# Patient Record
Sex: MALE | Race: WHITE
[De-identification: names, ages, dates, MRNs, and addresses within clinical notes are randomized per-mention and may not be internally consistent; named-entity substitution may affect disease eponyms.]

---

## 2018-11-21 ENCOUNTER — HOSPITAL ENCOUNTER (EMERGENCY)
Dept: HOSPITAL 83 - ED | Age: 23
Discharge: HOME | End: 2018-11-21
Payer: SELF-PAY

## 2018-11-21 VITALS — HEIGHT: 70.98 IN | BODY MASS INDEX: 26.6 KG/M2 | WEIGHT: 190 LBS

## 2018-11-21 VITALS — DIASTOLIC BLOOD PRESSURE: 85 MMHG

## 2018-11-21 DIAGNOSIS — Z79.899: ICD-10-CM

## 2018-11-21 DIAGNOSIS — K04.7: Primary | ICD-10-CM

## 2022-10-24 ENCOUNTER — HOSPITAL ENCOUNTER (INPATIENT)
Age: 27
LOS: 2 days | Discharge: HOME | DRG: 143 | End: 2022-10-26
Attending: EMERGENCY MEDICINE | Admitting: STUDENT IN AN ORGANIZED HEALTH CARE EDUCATION/TRAINING PROGRAM
Payer: MEDICARE

## 2022-10-24 ENCOUNTER — APPOINTMENT (OUTPATIENT)
Dept: GENERAL RADIOLOGY | Age: 27
DRG: 143 | End: 2022-10-24
Payer: MEDICARE

## 2022-10-24 DIAGNOSIS — J93.11 PRIMARY SPONTANEOUS PNEUMOTHORAX: Primary | ICD-10-CM

## 2022-10-24 PROBLEM — J93.9 PNEUMOTHORAX: Status: ACTIVE | Noted: 2022-10-24

## 2022-10-24 LAB
ALBUMIN SERPL-MCNC: 4.9 G/DL (ref 3.5–5.2)
ALP BLD-CCNC: 70 U/L (ref 40–129)
ALT SERPL-CCNC: 13 U/L (ref 0–40)
ANION GAP SERPL CALCULATED.3IONS-SCNC: 13 MMOL/L (ref 7–16)
AST SERPL-CCNC: 18 U/L (ref 0–39)
BASOPHILS ABSOLUTE: 0.04 E9/L (ref 0–0.2)
BASOPHILS RELATIVE PERCENT: 0.3 % (ref 0–2)
BILIRUB SERPL-MCNC: 1 MG/DL (ref 0–1.2)
BUN BLDV-MCNC: 7 MG/DL (ref 6–20)
CALCIUM SERPL-MCNC: 10.1 MG/DL (ref 8.6–10.2)
CHLORIDE BLD-SCNC: 98 MMOL/L (ref 98–107)
CO2: 26 MMOL/L (ref 22–29)
CREAT SERPL-MCNC: 1.1 MG/DL (ref 0.7–1.2)
EKG ATRIAL RATE: 65 BPM
EKG P AXIS: 74 DEGREES
EKG P-R INTERVAL: 164 MS
EKG Q-T INTERVAL: 372 MS
EKG QRS DURATION: 90 MS
EKG QTC CALCULATION (BAZETT): 386 MS
EKG R AXIS: 66 DEGREES
EKG T AXIS: 70 DEGREES
EKG VENTRICULAR RATE: 65 BPM
EOSINOPHILS ABSOLUTE: 0.09 E9/L (ref 0.05–0.5)
EOSINOPHILS RELATIVE PERCENT: 0.7 % (ref 0–6)
GFR SERPL CREATININE-BSD FRML MDRD: >60 ML/MIN/1.73
GLUCOSE BLD-MCNC: 111 MG/DL (ref 74–99)
HCT VFR BLD CALC: 46.6 % (ref 37–54)
HEMOGLOBIN: 16.1 G/DL (ref 12.5–16.5)
IMMATURE GRANULOCYTES #: 0.06 E9/L
IMMATURE GRANULOCYTES %: 0.5 % (ref 0–5)
LYMPHOCYTES ABSOLUTE: 1.38 E9/L (ref 1.5–4)
LYMPHOCYTES RELATIVE PERCENT: 11.5 % (ref 20–42)
MCH RBC QN AUTO: 34 PG (ref 26–35)
MCHC RBC AUTO-ENTMCNC: 34.5 % (ref 32–34.5)
MCV RBC AUTO: 98.3 FL (ref 80–99.9)
MONOCYTES ABSOLUTE: 0.79 E9/L (ref 0.1–0.95)
MONOCYTES RELATIVE PERCENT: 6.6 % (ref 2–12)
NEUTROPHILS ABSOLUTE: 9.67 E9/L (ref 1.8–7.3)
NEUTROPHILS RELATIVE PERCENT: 80.4 % (ref 43–80)
PDW BLD-RTO: 12.3 FL (ref 11.5–15)
PLATELET # BLD: 209 E9/L (ref 130–450)
PMV BLD AUTO: 10.7 FL (ref 7–12)
POTASSIUM SERPL-SCNC: 3.7 MMOL/L (ref 3.5–5)
RBC # BLD: 4.74 E12/L (ref 3.8–5.8)
SODIUM BLD-SCNC: 137 MMOL/L (ref 132–146)
TOTAL PROTEIN: 7.6 G/DL (ref 6.4–8.3)
TROPONIN, HIGH SENSITIVITY: <6 NG/L (ref 0–11)
WBC # BLD: 12 E9/L (ref 4.5–11.5)

## 2022-10-24 PROCEDURE — 85025 COMPLETE CBC W/AUTO DIFF WBC: CPT

## 2022-10-24 PROCEDURE — 2500000003 HC RX 250 WO HCPCS

## 2022-10-24 PROCEDURE — 80053 COMPREHEN METABOLIC PANEL: CPT

## 2022-10-24 PROCEDURE — 93005 ELECTROCARDIOGRAM TRACING: CPT | Performed by: PHYSICIAN ASSISTANT

## 2022-10-24 PROCEDURE — 2060000000 HC ICU INTERMEDIATE R&B

## 2022-10-24 PROCEDURE — 71045 X-RAY EXAM CHEST 1 VIEW: CPT

## 2022-10-24 PROCEDURE — 0W9B30Z DRAINAGE OF LEFT PLEURAL CAVITY WITH DRAINAGE DEVICE, PERCUTANEOUS APPROACH: ICD-10-PCS | Performed by: EMERGENCY MEDICINE

## 2022-10-24 PROCEDURE — 84484 ASSAY OF TROPONIN QUANT: CPT

## 2022-10-24 PROCEDURE — 32551 INSERTION OF CHEST TUBE: CPT

## 2022-10-24 PROCEDURE — 99152 MOD SED SAME PHYS/QHP 5/>YRS: CPT

## 2022-10-24 PROCEDURE — 2500000003 HC RX 250 WO HCPCS: Performed by: EMERGENCY MEDICINE

## 2022-10-24 PROCEDURE — 6360000002 HC RX W HCPCS: Performed by: STUDENT IN AN ORGANIZED HEALTH CARE EDUCATION/TRAINING PROGRAM

## 2022-10-24 PROCEDURE — 99285 EMERGENCY DEPT VISIT HI MDM: CPT

## 2022-10-24 PROCEDURE — 71046 X-RAY EXAM CHEST 2 VIEWS: CPT

## 2022-10-24 PROCEDURE — 2580000003 HC RX 258: Performed by: STUDENT IN AN ORGANIZED HEALTH CARE EDUCATION/TRAINING PROGRAM

## 2022-10-24 RX ORDER — SENNA PLUS 8.6 MG/1
1 TABLET ORAL DAILY PRN
Status: DISCONTINUED | OUTPATIENT
Start: 2022-10-24 | End: 2022-10-26 | Stop reason: HOSPADM

## 2022-10-24 RX ORDER — KETAMINE HYDROCHLORIDE 10 MG/ML
1 INJECTION, SOLUTION INTRAMUSCULAR; INTRAVENOUS ONCE
Status: COMPLETED | OUTPATIENT
Start: 2022-10-24 | End: 2022-10-24

## 2022-10-24 RX ORDER — POTASSIUM CHLORIDE 7.45 MG/ML
10 INJECTION INTRAVENOUS PRN
Status: DISCONTINUED | OUTPATIENT
Start: 2022-10-24 | End: 2022-10-26 | Stop reason: HOSPADM

## 2022-10-24 RX ORDER — SODIUM CHLORIDE, SODIUM LACTATE, POTASSIUM CHLORIDE, CALCIUM CHLORIDE 600; 310; 30; 20 MG/100ML; MG/100ML; MG/100ML; MG/100ML
INJECTION, SOLUTION INTRAVENOUS ONCE
Status: COMPLETED | OUTPATIENT
Start: 2022-10-24 | End: 2022-10-24

## 2022-10-24 RX ORDER — LANOLIN ALCOHOL/MO/W.PET/CERES
3 CREAM (GRAM) TOPICAL NIGHTLY PRN
Status: DISCONTINUED | OUTPATIENT
Start: 2022-10-24 | End: 2022-10-26 | Stop reason: HOSPADM

## 2022-10-24 RX ORDER — POTASSIUM CHLORIDE 20 MEQ/1
40 TABLET, EXTENDED RELEASE ORAL PRN
Status: DISCONTINUED | OUTPATIENT
Start: 2022-10-24 | End: 2022-10-26 | Stop reason: HOSPADM

## 2022-10-24 RX ORDER — ACETAMINOPHEN 650 MG/1
650 SUPPOSITORY RECTAL EVERY 6 HOURS PRN
Status: DISCONTINUED | OUTPATIENT
Start: 2022-10-24 | End: 2022-10-26 | Stop reason: HOSPADM

## 2022-10-24 RX ORDER — ENOXAPARIN SODIUM 100 MG/ML
40 INJECTION SUBCUTANEOUS DAILY
Status: DISCONTINUED | OUTPATIENT
Start: 2022-10-24 | End: 2022-10-26 | Stop reason: HOSPADM

## 2022-10-24 RX ORDER — ONDANSETRON 2 MG/ML
4 INJECTION INTRAMUSCULAR; INTRAVENOUS EVERY 6 HOURS PRN
Status: DISCONTINUED | OUTPATIENT
Start: 2022-10-24 | End: 2022-10-26 | Stop reason: HOSPADM

## 2022-10-24 RX ORDER — ONDANSETRON 4 MG/1
4 TABLET, ORALLY DISINTEGRATING ORAL EVERY 8 HOURS PRN
Status: DISCONTINUED | OUTPATIENT
Start: 2022-10-24 | End: 2022-10-26 | Stop reason: HOSPADM

## 2022-10-24 RX ORDER — SODIUM CHLORIDE 0.9 % (FLUSH) 0.9 %
10 SYRINGE (ML) INJECTION EVERY 12 HOURS SCHEDULED
Status: DISCONTINUED | OUTPATIENT
Start: 2022-10-24 | End: 2022-10-26 | Stop reason: HOSPADM

## 2022-10-24 RX ORDER — LIDOCAINE HYDROCHLORIDE 10 MG/ML
20 INJECTION, SOLUTION INFILTRATION; PERINEURAL ONCE
Status: COMPLETED | OUTPATIENT
Start: 2022-10-24 | End: 2022-10-24

## 2022-10-24 RX ORDER — MORPHINE SULFATE 2 MG/ML
2 INJECTION, SOLUTION INTRAMUSCULAR; INTRAVENOUS EVERY 4 HOURS PRN
Status: DISCONTINUED | OUTPATIENT
Start: 2022-10-24 | End: 2022-10-25

## 2022-10-24 RX ORDER — ACETAMINOPHEN 325 MG/1
650 TABLET ORAL EVERY 6 HOURS PRN
Status: DISCONTINUED | OUTPATIENT
Start: 2022-10-24 | End: 2022-10-26 | Stop reason: HOSPADM

## 2022-10-24 RX ORDER — SODIUM CHLORIDE 0.9 % (FLUSH) 0.9 %
10 SYRINGE (ML) INJECTION PRN
Status: DISCONTINUED | OUTPATIENT
Start: 2022-10-24 | End: 2022-10-26 | Stop reason: HOSPADM

## 2022-10-24 RX ORDER — SODIUM CHLORIDE 9 MG/ML
INJECTION, SOLUTION INTRAVENOUS PRN
Status: DISCONTINUED | OUTPATIENT
Start: 2022-10-24 | End: 2022-10-26 | Stop reason: HOSPADM

## 2022-10-24 RX ADMIN — SODIUM CHLORIDE, PRESERVATIVE FREE 10 ML: 5 INJECTION INTRAVENOUS at 20:15

## 2022-10-24 RX ADMIN — ENOXAPARIN SODIUM 40 MG: 100 INJECTION SUBCUTANEOUS at 20:15

## 2022-10-24 RX ADMIN — LIDOCAINE HYDROCHLORIDE 20 ML: 10 INJECTION, SOLUTION INFILTRATION; PERINEURAL at 16:55

## 2022-10-24 RX ADMIN — KETAMINE HYDROCHLORIDE 77.1 MG: 10 INJECTION, SOLUTION INTRAMUSCULAR; INTRAVENOUS at 16:54

## 2022-10-24 RX ADMIN — SODIUM CHLORIDE, POTASSIUM CHLORIDE, SODIUM LACTATE AND CALCIUM CHLORIDE: 600; 310; 30; 20 INJECTION, SOLUTION INTRAVENOUS at 20:15

## 2022-10-24 ASSESSMENT — PAIN DESCRIPTION - LOCATION: LOCATION: CHEST

## 2022-10-24 ASSESSMENT — LIFESTYLE VARIABLES
HOW OFTEN DO YOU HAVE A DRINK CONTAINING ALCOHOL: NEVER
HOW OFTEN DO YOU HAVE A DRINK CONTAINING ALCOHOL: MONTHLY OR LESS
HOW MANY STANDARD DRINKS CONTAINING ALCOHOL DO YOU HAVE ON A TYPICAL DAY: PATIENT DOES NOT DRINK
HOW MANY STANDARD DRINKS CONTAINING ALCOHOL DO YOU HAVE ON A TYPICAL DAY: PATIENT DOES NOT DRINK

## 2022-10-24 ASSESSMENT — PAIN SCALES - GENERAL: PAINLEVEL_OUTOF10: 6

## 2022-10-24 ASSESSMENT — ENCOUNTER SYMPTOMS
VOMITING: 0
ABDOMINAL PAIN: 0
EYES NEGATIVE: 1
SHORTNESS OF BREATH: 1
NAUSEA: 0
DIARRHEA: 0

## 2022-10-24 ASSESSMENT — PAIN DESCRIPTION - ORIENTATION: ORIENTATION: UPPER;LEFT

## 2022-10-24 ASSESSMENT — PAIN DESCRIPTION - DESCRIPTORS: DESCRIPTORS: STABBING

## 2022-10-24 NOTE — PROGRESS NOTES
Admission database completed to best of this RN's ability. Care plan and education initiated. Pt independent from home with aunt. Denies any DME or [place] [id number] services prior to admission.

## 2022-10-24 NOTE — ED PROVIDER NOTES
Pollie Dubin is a 31-year-old male who presents to the emergency department for chest pain that started approximately 4 AM.  Reports the complaint is constant, moderate severity, and worsens with deep breath. He denies any fall or trauma, states he just woke up to the pain. Admits to smoking cigarettes. Patient also states he is lost approximately 100 pounds over the past 5 years, was not attempting to lose weight. He denies hemoptysis, fever, chills, radiating pain, abdominal pain, nausea vomiting diarrhea. The history is provided by the patient. Review of Systems   Constitutional:  Negative for chills and fever. HENT: Negative. Negative for congestion. Eyes: Negative. Respiratory:  Positive for shortness of breath. Cardiovascular:  Positive for chest pain. Gastrointestinal:  Negative for abdominal pain, diarrhea, nausea and vomiting. Genitourinary: Negative. Musculoskeletal:  Negative for neck pain and neck stiffness. Skin: Negative. Neurological:  Negative for dizziness, light-headedness and headaches. Psychiatric/Behavioral: Negative. Physical Exam  Constitutional:       Appearance: Normal appearance. HENT:      Head: Normocephalic and atraumatic. Eyes:      Pupils: Pupils are equal, round, and reactive to light. Cardiovascular:      Rate and Rhythm: Normal rate and regular rhythm. Pulmonary:      Effort: Pulmonary effort is normal.      Breath sounds: No wheezing, rhonchi or rales. Comments: Decreased breath sounds on left in comparison to the right  Abdominal:      Palpations: Abdomen is soft. Tenderness: There is no abdominal tenderness. There is no guarding or rebound. Musculoskeletal:      Cervical back: Normal range of motion. No rigidity. Skin:     General: Skin is warm and dry. Capillary Refill: Capillary refill takes less than 2 seconds. Neurological:      General: No focal deficit present.       Mental Status: He is alert and oriented to person, place, and time. Procedures   PROCEDURE  10/24/22       Time: 5:10 PM    CHEST TUBE INSERTION  Risks, benefits and alternatives (for applicable procedures below) described. Performed By: Alison Conrad DO. Under Dr. Apurva Benson supervision    Indication:  pneumothorax. Informed consent: Written consent obtained. The patient was counseled regarding the procedure in person, it's indications, risks, potential complications and alternatives and any questions were answered. Consent was obtained. .  Procedure: The left side was prepped with betadine and draped in a sterile fashion. Local anesthesia  obtained by infiltration using 1% Lidocaine without epinephrine. A Pigtail catheter was inserted at the 4th intercostal space laterally in the midaxillary line. .    Chest tube output:  air. Post Procedure Xray: which showed good tube position. Procedural Complications:  None. Patient tolerated the procedure well. Conscious Sedation Procedure Note    Indication: procedural pain management    Consent: I have discussed with the patient and/or the patient representative the indication, alternatives, and the possible risks and/or complications of the planned procedure and the anesthesia methods. The patient and/or patient representative appear to understand and agree to proceed. Physician Involvement: The attending physician was present and supervising this procedure. Pre-Sedation Documentation and Exam: I have personally completed a history, physical exam & review of systems for this patient (see notes). Vital signs have been reviewed (see flow sheet for vitals).   Airway Assessment: normal    Prior History of Anesthesia Complications: none    ASA Classification: Class 1 - A normal healthy patient    Sedation/ Anesthesia Plan: intravenous sedation    Medications Used: ketamine intravenously    Monitoring and Safety: The patient was placed on a cardiac monitor and vital signs, pulse oximetry and level of consciousness were continuously evaluated throughout the procedure. The patient was closely monitored until recovery from the medications was complete and the patient had returned to baseline status. Respiratory therapy was on standby at all times during the procedure. (The following sections must be completed)  Post-Sedation Vital Signs: Vital signs were reviewed and were stable after the procedure (see flow sheet for vitals)            Post-Sedation Exam: Lungs: clear to auscultation bilaterally           Complications: none       EKG: This EKG is signed and interpreted by me. Rate: 65  Rhythm: Sinus  Interpretation: Normal axis. No ST or T wave changes. Comparison: no previous EKG      MDM  Number of Diagnoses or Management Options  Primary spontaneous pneumothorax  Diagnosis management comments: Patient presented emergency department for left-sided chest pain, chest x-ray shows pneumothorax. Chest tube was placed under conscious sedation, see procedural notes. Dr. Selena Siegel discussed case with Dr. Lazaro Menjivar who will provide consultation. Discussed case with Dr. Kulwinder Toussaint he will admit the patient to a telemetry floor. --------------------------------------------- PAST HISTORY ---------------------------------------------  Past Medical History:  has a past medical history of Bronchitis. Past Surgical History:  has no past surgical history on file. Social History:  reports that he has been smoking cigarettes. He has been smoking an average of .5 packs per day. He has never used smokeless tobacco. He reports current alcohol use. He reports current drug use. Drug: Marijuana [place]). Family History: family history is not on file. The patients home medications have been reviewed. Allergies: Patient has no known allergies.     -------------------------------------------------- RESULTS -------------------------------------------------    LABS:  Results for orders placed or performed during the hospital encounter of 10/24/22   CBC with Auto Differential   Result Value Ref Range    WBC 12.0 (H) 4.5 - 11.5 E9/L    RBC 4.74 3.80 - 5.80 E12/L    Hemoglobin 16.1 12.5 - 16.5 g/dL    Hematocrit 46.6 37.0 - 54.0 %    MCV 98.3 80.0 - 99.9 fL    MCH 34.0 26.0 - 35.0 pg    MCHC 34.5 32.0 - 34.5 %    RDW 12.3 11.5 - 15.0 fL    Platelets 396 506 - 109 E9/L    MPV 10.7 7.0 - 12.0 fL    Neutrophils % 80.4 (H) 43.0 - 80.0 %    Immature Granulocytes % 0.5 0.0 - 5.0 %    Lymphocytes % 11.5 (L) 20.0 - 42.0 %    Monocytes % 6.6 2.0 - 12.0 %    Eosinophils % 0.7 0.0 - 6.0 %    Basophils % 0.3 0.0 - 2.0 %    Neutrophils Absolute 9.67 (H) 1.80 - 7.30 E9/L    Immature Granulocytes # 0.06 E9/L    Lymphocytes Absolute 1.38 (L) 1.50 - 4.00 E9/L    Monocytes Absolute 0.79 0.10 - 0.95 E9/L    Eosinophils Absolute 0.09 0.05 - 0.50 E9/L    Basophils Absolute 0.04 0.00 - 0.20 E9/L   Comprehensive Metabolic Panel   Result Value Ref Range    Sodium 137 132 - 146 mmol/L    Potassium 3.7 3.5 - 5.0 mmol/L    Chloride 98 98 - 107 mmol/L    CO2 26 22 - 29 mmol/L    Anion Gap 13 7 - 16 mmol/L    Glucose 111 (H) 74 - 99 mg/dL    BUN 7 6 - 20 mg/dL    Creatinine 1.1 0.7 - 1.2 mg/dL    Est, Glom Filt Rate >60 >=60 mL/min/1.73    Calcium 10.1 8.6 - 10.2 mg/dL    Total Protein 7.6 6.4 - 8.3 g/dL    Albumin 4.9 3.5 - 5.2 g/dL    Total Bilirubin 1.0 0.0 - 1.2 mg/dL    Alkaline Phosphatase 70 40 - 129 U/L    ALT 13 0 - 40 U/L    AST 18 0 - 39 U/L   Troponin   Result Value Ref Range    Troponin, High Sensitivity <6 0 - 11 ng/L   EKG 12 Lead   Result Value Ref Range    Ventricular Rate 65 BPM    Atrial Rate 65 BPM    P-R Interval 164 ms    QRS Duration 90 ms    Q-T Interval 372 ms    QTc Calculation (Bazett) 386 ms    P Axis 74 degrees    R Axis 66 degrees    T Axis 70 degrees       RADIOLOGY:  Interpreted by Radiologist.  XR CHEST PORTABLE   Final Result   Interval resolution of left pneumothorax, post chest tube placement. XR CHEST (2 VW)   Final Result   Moderate to large left-sided pneumothorax, and mild mediastinal deviation to   the right. Critical results were called by Dr. Khari Camacho to [place], [place] on   10/24/2022 at 2:22 p.m.             ------------------------- NURSING NOTES AND VITALS REVIEWED ---------------------------   The nursing notes within the ED encounter and vital signs as below have been reviewed. BP (!) 106/58   Pulse 60   Temp 98.3 °F (36.8 °C) (Oral)   Resp 18   Ht 5' 11\" (1.803 m)   Wt 170 lb (77.1 kg)   SpO2 100%   BMI 23.71 kg/m²   Oxygen Saturation Interpretation: Normal      ------------------------------------------ PROGRESS NOTES ------------------------------------------   The emergency provider has spoken with the patient and discussed todays results, in addition to providing specific details for the plan of care and counseling regarding the diagnosis and prognosis. Questions are answered at this time and they are agreeable with the plan.    --------------------------------- ADDITIONAL PROVIDER NOTES ---------------------------------        This patient is stable for discharge. The emergency provider has shared the specific conditions for return, as well as the importance of follow-up.        --------------------------------- IMPRESSION AND DISPOSITION ---------------------------------    IMPRESSION  1.  Primary spontaneous pneumothorax        DISPOSITION  Disposition: Admit to telemetry  Patient condition is serious        [facility] [place]  PGY-1             [facility] [place]  Resident  10/24/22 2040

## 2022-10-25 ENCOUNTER — APPOINTMENT (OUTPATIENT)
Dept: GENERAL RADIOLOGY | Age: 27
DRG: 143 | End: 2022-10-25
Payer: MEDICARE

## 2022-10-25 LAB
ADENOVIRUS BY PCR: NOT DETECTED
ALBUMIN SERPL-MCNC: 4.3 G/DL (ref 3.5–5.2)
ALP BLD-CCNC: 66 U/L (ref 40–129)
ALT SERPL-CCNC: 9 U/L (ref 0–40)
ANION GAP SERPL CALCULATED.3IONS-SCNC: 12 MMOL/L (ref 7–16)
AST SERPL-CCNC: 11 U/L (ref 0–39)
BASOPHILS ABSOLUTE: 0.02 E9/L (ref 0–0.2)
BASOPHILS RELATIVE PERCENT: 0.2 % (ref 0–2)
BILIRUB SERPL-MCNC: 1 MG/DL (ref 0–1.2)
BORDETELLA PARAPERTUSSIS BY PCR: NOT DETECTED
BORDETELLA PERTUSSIS BY PCR: NOT DETECTED
BUN BLDV-MCNC: 7 MG/DL (ref 6–20)
CALCIUM SERPL-MCNC: 9.9 MG/DL (ref 8.6–10.2)
CHLAMYDOPHILIA PNEUMONIAE BY PCR: NOT DETECTED
CHLORIDE BLD-SCNC: 103 MMOL/L (ref 98–107)
CO2: 23 MMOL/L (ref 22–29)
CORONAVIRUS 229E BY PCR: NOT DETECTED
CORONAVIRUS HKU1 BY PCR: NOT DETECTED
CORONAVIRUS NL63 BY PCR: NOT DETECTED
CORONAVIRUS OC43 BY PCR: NOT DETECTED
CREAT SERPL-MCNC: 0.9 MG/DL (ref 0.7–1.2)
EOSINOPHILS ABSOLUTE: 0 E9/L (ref 0.05–0.5)
EOSINOPHILS RELATIVE PERCENT: 0 % (ref 0–6)
GFR SERPL CREATININE-BSD FRML MDRD: >60 ML/MIN/1.73
GLUCOSE BLD-MCNC: 91 MG/DL (ref 74–99)
HCT VFR BLD CALC: 41.8 % (ref 37–54)
HEMOGLOBIN: 14.6 G/DL (ref 12.5–16.5)
HUMAN METAPNEUMOVIRUS BY PCR: NOT DETECTED
HUMAN RHINOVIRUS/ENTEROVIRUS BY PCR: NOT DETECTED
IMMATURE GRANULOCYTES #: 0.03 E9/L
IMMATURE GRANULOCYTES %: 0.4 % (ref 0–5)
INFLUENZA A BY PCR: NOT DETECTED
INFLUENZA B BY PCR: NOT DETECTED
LYMPHOCYTES ABSOLUTE: 1.35 E9/L (ref 1.5–4)
LYMPHOCYTES RELATIVE PERCENT: 16.2 % (ref 20–42)
MCH RBC QN AUTO: 34.2 PG (ref 26–35)
MCHC RBC AUTO-ENTMCNC: 34.9 % (ref 32–34.5)
MCV RBC AUTO: 97.9 FL (ref 80–99.9)
MONOCYTES ABSOLUTE: 0.61 E9/L (ref 0.1–0.95)
MONOCYTES RELATIVE PERCENT: 7.3 % (ref 2–12)
MYCOPLASMA PNEUMONIAE BY PCR: NOT DETECTED
NEUTROPHILS ABSOLUTE: 6.34 E9/L (ref 1.8–7.3)
NEUTROPHILS RELATIVE PERCENT: 75.9 % (ref 43–80)
PARAINFLUENZA VIRUS 1 BY PCR: NOT DETECTED
PARAINFLUENZA VIRUS 2 BY PCR: NOT DETECTED
PARAINFLUENZA VIRUS 3 BY PCR: NOT DETECTED
PARAINFLUENZA VIRUS 4 BY PCR: NOT DETECTED
PDW BLD-RTO: 12.5 FL (ref 11.5–15)
PLATELET # BLD: 188 E9/L (ref 130–450)
PMV BLD AUTO: 11.1 FL (ref 7–12)
POTASSIUM REFLEX MAGNESIUM: 4.1 MMOL/L (ref 3.5–5)
RBC # BLD: 4.27 E12/L (ref 3.8–5.8)
RESPIRATORY SYNCYTIAL VIRUS BY PCR: NOT DETECTED
SARS-COV-2, PCR: NOT DETECTED
SODIUM BLD-SCNC: 138 MMOL/L (ref 132–146)
TOTAL PROTEIN: 6.8 G/DL (ref 6.4–8.3)
WBC # BLD: 8.4 E9/L (ref 4.5–11.5)

## 2022-10-25 PROCEDURE — 0202U NFCT DS 22 TRGT SARS-COV-2: CPT

## 2022-10-25 PROCEDURE — 71045 X-RAY EXAM CHEST 1 VIEW: CPT

## 2022-10-25 PROCEDURE — 36415 COLL VENOUS BLD VENIPUNCTURE: CPT

## 2022-10-25 PROCEDURE — 2580000003 HC RX 258: Performed by: STUDENT IN AN ORGANIZED HEALTH CARE EDUCATION/TRAINING PROGRAM

## 2022-10-25 PROCEDURE — 2060000000 HC ICU INTERMEDIATE R&B

## 2022-10-25 PROCEDURE — 6360000002 HC RX W HCPCS: Performed by: STUDENT IN AN ORGANIZED HEALTH CARE EDUCATION/TRAINING PROGRAM

## 2022-10-25 PROCEDURE — 85025 COMPLETE CBC W/AUTO DIFF WBC: CPT

## 2022-10-25 PROCEDURE — 80053 COMPREHEN METABOLIC PANEL: CPT

## 2022-10-25 RX ORDER — HYDROCODONE BITARTRATE AND ACETAMINOPHEN 5; 325 MG/1; MG/1
1 TABLET ORAL EVERY 6 HOURS PRN
Status: DISCONTINUED | OUTPATIENT
Start: 2022-10-25 | End: 2022-10-26 | Stop reason: HOSPADM

## 2022-10-25 RX ADMIN — ENOXAPARIN SODIUM 40 MG: 100 INJECTION SUBCUTANEOUS at 19:44

## 2022-10-25 RX ADMIN — SODIUM CHLORIDE, PRESERVATIVE FREE 10 ML: 5 INJECTION INTRAVENOUS at 19:44

## 2022-10-25 RX ADMIN — MORPHINE SULFATE 2 MG: 2 INJECTION, SOLUTION INTRAMUSCULAR; INTRAVENOUS at 01:14

## 2022-10-25 ASSESSMENT — PAIN DESCRIPTION - DESCRIPTORS: DESCRIPTORS: SORE

## 2022-10-25 ASSESSMENT — PAIN DESCRIPTION - ORIENTATION: ORIENTATION: UPPER;LEFT

## 2022-10-25 ASSESSMENT — PAIN SCALES - GENERAL: PAINLEVEL_OUTOF10: 7

## 2022-10-25 ASSESSMENT — PAIN DESCRIPTION - LOCATION: LOCATION: CHEST

## 2022-10-25 NOTE — PLAN OF CARE
Problem: Safety - Medical Restraint  Goal: Remains free of injury from restraints (Restraint for Interference with Medical Device)  Description: INTERVENTIONS:  1. Determine that other, less restrictive measures have been tried or would not be effective before applying the restraint  2. Evaluate the patient's condition at the time of restraint application  3. Inform patient/family regarding the reason for restraint  4.  Q2H: Monitor safety, psychosocial status, comfort, nutrition and hydration  Outcome: Progressing     Problem: Pain  Goal: Verbalizes/displays adequate comfort level or baseline comfort level  Outcome: Progressing     Problem: Discharge Planning  Goal: Discharge to home or other facility with appropriate resources  Outcome: Progressing

## 2022-10-25 NOTE — CONSULTS
Alissa Flores M.D.,[facility]  Taya Lawton D.O., F.A.C.O.I., Melissa Santana M.D. Martin Adan M.D. Jean-Claude Vazquez D.O. Patient:  Param Banks 33 y.o. male MRN: [id number]     Date of Service: 10/25/2022      PULMONARY CONSULTATION    Reason for Consultation: Chest tube  Referring Physician: Dr Seda Mcwilliams with the referring physician will be sent via the electronic medical record. Chief Complaint: Left-sided chest pain    CODE STATUS: Full    SUBJECTIVE:  HPI:  Param Banks is a 33 y.o.  male who we are asked to evaluate for chest tube management. He has no significant past medical history. He is a current smoker of cigarettes half pack per day since age 16 and also smokes marijuana. Denies any other recreational or IV drug use. He also admits to significant amount of weight loss over 5 years has lost close to 120 pounds. 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 However he was not trying to lose weight. He woke up early yesterday at 2 AM.  By 4:00 in the morning he had a cigarette and experienced a coughing spell and noticed an acute onset of sharp left-sided chest pain. He also felt shortness of breath. He came to the ED for further evaluation. There were decreased breath sounds on the left. Radiology reviewed-chest x-ray with moderate to large left-sided pneumothorax with mild mediastinal deviation to the right. Pigtail chest tube placed for lung reexpansion. He is currently on -20 cm suction with no air leak noted. Repeat chest x-ray today lung remains reexpanded. Lab testing-sodium 138, potassium 4.1, BUN 7, creatinine 0.9, WBCs 8.4, hemoglobin 14.6, ALT 9, AST 11,. Currently sitting up in bed in no acute distress. Not requiring supplemental oxygen. Past Medical History:   Diagnosis Date    Bronchitis        History reviewed. No pertinent surgical history.     History reviewed. No pertinent family history. Father with COPD he is a former smoker  Social History:   Social History     Socioeconomic History    Marital status: Single     Spouse name: Not on file    Number of children: Not on file    Years of education: Not on file    Highest education level: Not on file   Occupational History    Not on file   Tobacco Use    Smoking status: Every Day     Packs/day: 0.50     Types: Cigarettes    Smokeless tobacco: Never   Vaping Use    Vaping Use: Never used   Substance and Sexual Activity    Alcohol use: Yes     Comment: socially    Drug use: Yes     Types: Marijuana Betsy Dionisio)    Sexual activity: Not on file   Other Topics Concern    Not on file   Social History Narrative    Not on file     Social Determinants of Health     Financial Resource Strain: Not on file   Food Insecurity: Not on file   Transportation Needs: Not on file   Physical Activity: Not on file   Stress: Not on file   Social Connections: Not on file   Intimate Partner Violence: Not on file   Housing Stability: Not on file     Smoking history: The patient is an active smoker of cigarettes half pack per day since age 16    ETOH:   reports current alcohol use. Exposures: There is no known history of TB or asbestos. 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 No recent travel history. He does smoke marijuana but denies other illicit drug use. No  turtles birds at home. Vaccines: There is no immunization history on file for this patient. Home Meds: No medications prior to admission.     CURRENT MEDS :  Scheduled Meds:   sodium chloride flush  10 mL IntraVENous 2 times per day    enoxaparin  40 mg SubCUTAneous Daily       Continuous Infusions:   sodium chloride         No Known Allergies    REVIEW OF SYSTEMS:  Constitutional: Denies fever, weight loss, night sweats, and fatigue  Skin: Denies pigmentation, dark lesions, and rashes   HEENT: Denies hearing loss, tinnitus, ear drainage, epistaxis, sore throat, and hoarseness. Cardiovascular: Denies palpitations, chest pain, and chest pressure. Respiratory: Left-sided chest discomfort  Gastrointestinal: Denies nausea, vomiting, poor appetite, diarrhea, heartburn or reflux  Genitourinary: Denies dysuria, frequency, urgency or hematuria  Musculoskeletal: Denies myalgias, muscle weakness, and bone pain  Neurological: Denies dizziness, vertigo, headache, and focal weakness  Psychological: Denies anxiety and depression  Endocrine: Denies heat intolerance and cold intolerance  Hematopoietic/Lymphatic: Denies bleeding problems and blood transfusions    OBJECTIVE:   /74   Pulse 78   Temp 98.7 °F (37.1 °C) (Oral)   Resp 18   Ht 5' 11\" (1.803 m)   Wt 170 lb (77.1 kg)   SpO2 98%   BMI 23.71 kg/m²   SpO2 Readings from Last 1 Encounters:   10/25/22 98%        I/O:    Intake/Output Summary (Last 24 hours) at 10/25/2022 1425  Last data filed at 10/25/2022 0940  Gross per 24 hour   Intake 240 ml   Output 800 ml   Net -560 ml                      Physical Exam:  General: The patient is lying in bed comfortably without any distress. Breathing is not labored  HEENT: Pupils are equal round and reactive to light, there are no oral lesions and no post-nasal drip   Neck: supple without adenopathy  Cardiovascular: regular rate and rhythm without murmur or gallop  Respiratory: Clear to auscultation bilaterally without wheezing or crackles. Air entry is symmetric lungs clear with left pigtail chest tube in place, -20 cm suction no crepitus.   No airleak noted  Abdomen: soft, non-tender, non-distended, normal bowel sounds  Extremities: warm, no edema, no clubbing  Skin: no rash or lesion  Neurologic: CN II-XII grossly intact, no focal deficits    Pulmonary Function Testing none on file      Imaging personally reviewed:  Repeat chest x-ray 10/25/2022    Echo:  None on file    Labs:  Lab Results   Component Value Date/Time WBC 8.4 10/25/2022 03:57 AM    HGB 14.6 10/25/2022 03:57 AM    HCT 41.8 10/25/2022 03:57 AM    MCV 97.9 10/25/2022 03:57 AM    MCH 34.2 10/25/2022 03:57 AM    MCHC 34.9 10/25/2022 03:57 AM    RDW 12.5 10/25/2022 03:57 AM     10/25/2022 03:57 AM    MPV 11.1 10/25/2022 03:57 AM     Lab Results   Component Value Date/Time     10/25/2022 03:57 AM    K 4.1 10/25/2022 03:57 AM     10/25/2022 03:57 AM    CO2 23 10/25/2022 03:57 AM    BUN 7 10/25/2022 03:57 AM    CREATININE 0.9 10/25/2022 03:57 AM    LABALBU 4.3 10/25/2022 03:57 AM    CALCIUM 9.9 10/25/2022 03:57 AM    LABGLOM >60 10/25/2022 03:57 AM     No results found for: PROTIME, INR  No results for input(s): PROBNP in the last 72 hours. No results for input(s): TROPONINI in the last 72 hours. No results for input(s): PROCAL in the last 72 hours. This SmartLink has not been configured with any valid records. Micro:  No results for input(s): CULTRESP in the last 72 hours. No results for input(s): LABGRAM in the last 72 hours. No results for input(s): LEGUR in the last 72 hours. No results for input(s): STREPNEUMAGU in the last 72 hours. No results for input(s): LP1UAG in the last 72 hours. Assessment:  Cough induced left-sided pneumothorax with mild mediastinal shift requiring pigtail chest tube placement 10/24/2022  Ongoing nicotine dependence half pack per day since age 16  Marijuana use  Close to 120 pound weight loss over 5 years, unintentional    Plan:  Chest x-ray today  Place on waterseal today  Clamp chest tube in the morning 6 AM, repeat chest x-ray at 9 AM  Tobacco cessation  DVT, GI prophylaxis      Thank you for allowing me to participate in the care of [facility]. Please feel free to call with questions.      This plan of care was reviewed in collaboration with Dr. Lion Bradley    Electronically signed by ALEXANDRE Thomas CNP on 10/25/2022 at 2:25 PM      Note: This report was completed utilizing computer voice recognition software. Every effort has been made to ensure accuracy, however; inadvertent computerized transcription errors may be present      I personally saw, examined, and cared for the patient. Labs, medications, radiographs reviewed. I agree with history exam and plans detailed in NP note with the following additions: We are asked to see Mr. Sherley Chowdhury for a L sided spontaneous PTX after smoking a cigarette and having a coughing episode. His lung is expanded s/p chest tube without air leak. Will place on water seal and clamp in the AM with repeat CXR. Possible chest tube removal tomorrow.       Ady Tsang MD

## 2022-10-25 NOTE — PLAN OF CARE
Problem: Discharge Planning  Goal: Discharge to home or other facility with appropriate resources  10/25/2022 0900 by Bronson Jenkins RN  Outcome: Not Progressing  10/25/2022 0326 by Cristina Bryson RN  Outcome: Progressing

## 2022-10-25 NOTE — H&P
Internal Medicine History & Physical     Name: Poppy Rubio  : 1995  Chief Complaint: Chest Pain (States since 4 am- numbness in arms)  Primary Care Physician: Luis Fernando Jolly DO  Admission date: 10/24/2022  Date of service: 10/25/2022     History of Present Illness  Sravan Cano is a 33y.o. year old male. He presented to the emergency department on 10/24 around noon for chest pain that started around 4 AM this morning. These symptoms are severe in severity. Symptoms are made worse by couging. Associated symptoms include coughing; denied SOB. Did not have any fall or trauma, just woke up with pain. History of cigarette smoking, 1/2 PPD. Reports 100 pound weight loss over the past 5 years states from diet change; has seen PCP for this. CXR in the ED showed pneumothorax. Chest tube was placed in the ED under conscious sedation per ED documentation. Pulmonology was consulted from the ED; chest tube to suction -20. Symptoms were made better by chest tube placement. There are no family or friends at bedside. The history is provided by the patient. Patient is felt to be a good historian. ED course:   Initial blood work and imaging studies performed. Admission recommended by ED physician. Dr. Alisa Lyon discussed with ED provider. Meds in ED consisted of the following: Chest tube placement     Past Medical History:   Diagnosis Date    Bronchitis        History reviewed. No pertinent surgical history. He states that he has never had surgery. Family Medical History:  No pertinent family medical history with regard to current issues. Social History  Patient lives at home with his aunt on a farm. 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 He has been smoking an average of .5 packs per day. He has never used smokeless tobacco.  ETOH:   reports current alcohol use.     Home Medications  Prior to Admission medications    Not on File       Allergies  No Known Allergies    Review of Systems:   Please see HPI above. All bolded are positive. All un-bolded are negative. Constitutional Symptoms: fever, chills, fatigue, generalized weakness, diaphoresis, increase in thirst, loss of appetite  Eyes: vision change   Ears, Nose, Mouth, Throat: hearing loss, nasal congestion, sores in the mouth  Cardiovascular: chest pain, chest heaviness, palpitations  Respiratory: shortness of breath, wheezing, coughing  Gastrointestinal: abdominal pain, nausea, vomiting, diarrhea, constipation, melena, hematochezia, hematemesis  Genitourinary: dysuria, hematuria, increased frequency  Musculoskeletal: lower extremity edema, myalgias, arthralgias, back pain  Integumentary: rashes, itching   Neurological: headache, lightheadedness, dizziness, confusion, syncope, numbness, tingling, focal weakness  Psychiatric: depression, suicidal ideation, anxiety  Endocrine: unintentional weight change  Hematologic/Lymphatic: lymphadenopathy, easy bruising, easy bleeding   Allergic/Immunologic: recurrent infections      Objective  VITALS:  /73   Pulse 63   Temp 98.2 °F (36.8 °C) (Oral)   Resp 18   Ht 5' 11\" (1.803 m)   Wt 170 lb (77.1 kg)   SpO2 100%   BMI 23.71 kg/m²     Physical Exam:   General: awake, alert, oriented to person, place, time, and purpose, appears stated age, cooperative, no acute distress, pleasant, appropriate mood  Eyes: conjunctivae/corneas clear, sclera non icteric, EOMI  Ears: no obvious scars, no lesions, no masses, hearing intact  Mouth: mucous membranes moist, no obvious oral sores  Head: normocephalic, atraumatic  Neck: no JVD, no adenopathy, no thyromegaly, neck is supple, trachea is midline  Back: ROM normal, no CVA tenderness. Chest: no pain on palpation; left chest tube with occlusive dressing noted, to pleurivac with suction.    Lungs: clear to auscultation bilaterally, without rhonchi, crackle, wheezing, or rale, no retractions or use of accessory muscles  Heart: regular rate and regular rhythm, no murmur, normal S1, S2  Abdomen: soft, non-tender; bowel sounds normal; no masses, no organomegaly  : Deferred   Extremities: no lower extremity edema, extremities atraumatic, no cyanosis, no clubbing, 2+ pedal pulses palpated  Skin: normal color, normal texture, normal turgor, no rashes, no lesions; loose skin noted on chest and abdomen consistent with history significant of weight loss  Neurologic:5/5 muscle strength throughout, normal muscle tone throughout, face symmetric, hearing intact, tongue midline, speech appropriate without slurring, sensation to fine touch intact in upper and lower extremities    Labs-   Lab Results   Component Value Date    WBC 8.4 10/25/2022    HGB 14.6 10/25/2022    HCT 41.8 10/25/2022     10/25/2022     10/25/2022    K 4.1 10/25/2022     10/25/2022    CREATININE 0.9 10/25/2022    BUN 7 10/25/2022    CO2 23 10/25/2022    GLUCOSE 91 10/25/2022    ALT 9 10/25/2022    AST 11 10/25/2022     No results found for: CKTOTAL, CKMB, CKMBINDEX, TROPONINI      Recent Radiological Studies:  XR CHEST PORTABLE   Final Result   Interval resolution of left pneumothorax, post chest tube placement. XR CHEST (2 VW)   Final Result   Moderate to large left-sided pneumothorax, and mild mediastinal deviation to   the right. Critical results were called by Dr. Manny Fox to [place], [place] on   10/24/2022 at 2:22 p.m.              Assessment  Active Hospital Problems    Diagnosis     Pneumothorax [J93.9]      Priority: Medium       Patient Active Problem List    Diagnosis Date Noted    Pneumothorax 10/24/2022     Priority: Medium       Plan  Left-sided pneumothorax, unclear etiology:  Chest x-ray noted  Defer need for CT chest to pulmonology  Chest tube placed in ED-- to be managed by pulmonology   Jamal Mckeon as needed for pain  Check respiratory viral panel  Tobacco cessation education    Intentional massive weight loss:  Defer to PCP for further work-up as an outpatient    Not on home meds  Follow labs  DVT prophylaxis. Please see orders for further management and care.  for discharge planning  Discharge plan: home when stable     Yuong DO Evette, PGY-3  10/25/2022  6:09 AM    I can be reached through [email or web address]. NOTE:  This report was transcribed using voice recognition software. Every effort was made to ensure accuracy; however, inadvertent computerized transcription errors may be present. Addendum: I have personally participated in a face-to-face history and physical exam on the date of service with the patient. I have discussed the case with the resident. I also participated in medical decision making with the resident on the date of service and I agree with all of the pertinent clinical information unless indicated in my editing of the note. I have reviewed and edited the note above based on my findings during my history, exam, and decision making on the same day of service. My additional thoughts:   Pulmonology managing chest tube  Outpatient weight loss work-up per PCP  Consider CT chest as an inpatient-defer to pulmonology  Tobacco cessation education    Electronically signed by Maryjane Cervantes DO on 10/25/2022 at 8:57 AM    I can be reached through [facility].

## 2022-10-25 NOTE — CARE COORDINATION
Chest tube to suction. Met w/ patient. Explained role of  and plan of care. Lives w/ his aunt and cousin in a trailer w/ ramp entrance. Does not drive- cousin provides needed transportation. No H DMEs, HHC, or ARAM. PCP is Dr. Phuong Jones and pharmacy is [facility]. Per pt, plan is to return home on discharge-states his aunt or cousin will provide transportation. Anticipating no needs.  Will follow Savannah Farley RN case manager

## 2022-10-25 NOTE — PROGRESS NOTES
Dr. Bryon Burkitt contacted via perfect serve regarding chest tube orders. New orders received. Chest tube will be to continuous suction -[id number].

## 2022-10-26 ENCOUNTER — APPOINTMENT (OUTPATIENT)
Dept: GENERAL RADIOLOGY | Age: 27
DRG: 143 | End: 2022-10-26
Payer: MEDICARE

## 2022-10-26 VITALS
BODY MASS INDEX: 20.96 KG/M2 | HEIGHT: 71 IN | DIASTOLIC BLOOD PRESSURE: 81 MMHG | TEMPERATURE: 97.8 F | OXYGEN SATURATION: 97 % | SYSTOLIC BLOOD PRESSURE: 150 MMHG | RESPIRATION RATE: 18 BRPM | HEART RATE: 67 BPM | WEIGHT: 149.7 LBS

## 2022-10-26 LAB
ALBUMIN SERPL-MCNC: 4.3 G/DL (ref 3.5–5.2)
ALP BLD-CCNC: 58 U/L (ref 40–129)
ALT SERPL-CCNC: 8 U/L (ref 0–40)
ANION GAP SERPL CALCULATED.3IONS-SCNC: 14 MMOL/L (ref 7–16)
AST SERPL-CCNC: 10 U/L (ref 0–39)
BASOPHILS ABSOLUTE: 0.02 E9/L (ref 0–0.2)
BASOPHILS RELATIVE PERCENT: 0.3 % (ref 0–2)
BILIRUB SERPL-MCNC: 0.9 MG/DL (ref 0–1.2)
BUN BLDV-MCNC: 11 MG/DL (ref 6–20)
CALCIUM SERPL-MCNC: 9.7 MG/DL (ref 8.6–10.2)
CHLORIDE BLD-SCNC: 100 MMOL/L (ref 98–107)
CO2: 24 MMOL/L (ref 22–29)
CREAT SERPL-MCNC: 1 MG/DL (ref 0.7–1.2)
EOSINOPHILS ABSOLUTE: 0.05 E9/L (ref 0.05–0.5)
EOSINOPHILS RELATIVE PERCENT: 0.8 % (ref 0–6)
GFR SERPL CREATININE-BSD FRML MDRD: >60 ML/MIN/1.73
GLUCOSE BLD-MCNC: 83 MG/DL (ref 74–99)
HCT VFR BLD CALC: 43.5 % (ref 37–54)
HEMOGLOBIN: 15.2 G/DL (ref 12.5–16.5)
IMMATURE GRANULOCYTES #: 0.02 E9/L
IMMATURE GRANULOCYTES %: 0.3 % (ref 0–5)
LYMPHOCYTES ABSOLUTE: 1.79 E9/L (ref 1.5–4)
LYMPHOCYTES RELATIVE PERCENT: 27.2 % (ref 20–42)
MCH RBC QN AUTO: 34.2 PG (ref 26–35)
MCHC RBC AUTO-ENTMCNC: 34.9 % (ref 32–34.5)
MCV RBC AUTO: 97.8 FL (ref 80–99.9)
MONOCYTES ABSOLUTE: 0.62 E9/L (ref 0.1–0.95)
MONOCYTES RELATIVE PERCENT: 9.4 % (ref 2–12)
NEUTROPHILS ABSOLUTE: 4.08 E9/L (ref 1.8–7.3)
NEUTROPHILS RELATIVE PERCENT: 62 % (ref 43–80)
PDW BLD-RTO: 12.4 FL (ref 11.5–15)
PLATELET # BLD: 176 E9/L (ref 130–450)
PMV BLD AUTO: 10.6 FL (ref 7–12)
POTASSIUM REFLEX MAGNESIUM: 4.1 MMOL/L (ref 3.5–5)
RBC # BLD: 4.45 E12/L (ref 3.8–5.8)
SODIUM BLD-SCNC: 138 MMOL/L (ref 132–146)
TOTAL PROTEIN: 6.8 G/DL (ref 6.4–8.3)
WBC # BLD: 6.6 E9/L (ref 4.5–11.5)

## 2022-10-26 PROCEDURE — 85025 COMPLETE CBC W/AUTO DIFF WBC: CPT

## 2022-10-26 PROCEDURE — 71045 X-RAY EXAM CHEST 1 VIEW: CPT

## 2022-10-26 PROCEDURE — 36415 COLL VENOUS BLD VENIPUNCTURE: CPT

## 2022-10-26 PROCEDURE — 80053 COMPREHEN METABOLIC PANEL: CPT

## 2022-10-26 PROCEDURE — 2580000003 HC RX 258: Performed by: STUDENT IN AN ORGANIZED HEALTH CARE EDUCATION/TRAINING PROGRAM

## 2022-10-26 RX ADMIN — SODIUM CHLORIDE, PRESERVATIVE FREE 10 ML: 5 INJECTION INTRAVENOUS at 08:59

## 2022-10-26 ASSESSMENT — PAIN SCALES - GENERAL: PAINLEVEL_OUTOF10: 0

## 2022-10-26 NOTE — PROGRESS NOTES
Internal Medicine Progress Note    Patient's name: Rosanna Holcomb  : 1995  Chief complaints (on day of admission): Chest Pain (States since 4 am- numbness in arms)  Admission date: 10/24/2022  Date of service: 10/26/2022   Room: [address] INTERNAL MEDICINE 2  Primary care physician: Carissa Summers DO  Reason for visit: Follow-up for left-sided PTX    Subjective  Suyapa Ferro was seen and examined at bedside. Well-appearing, alert sitting up in bed no distress. Chest tube present. No complaints at this time. Discussed tobacco cessation with patient. New complaints none    Current treatment plan discussed and all questions answered. Current medications being prescribed discussed and patient expresses verbal understanding       Review of Systems  There are no new complaints of chest pain, shortness of breath, abdominal pain, nausea, vomiting, diarrhea, constipation unless otherwise mentioned above.      Hospital Medications  Current Facility-Administered Medications   Medication Dose Route Frequency Provider Last Rate Last Admin    HYDROcodone-acetaminophen (NORCO) 5-325 MG per tablet 1 tablet  1 tablet Oral Q6H PRN Anil Gibbs DO        sodium chloride flush 0.9 % injection 10 mL  10 mL IntraVENous 2 times per day Radha Sandoval MD   10 mL at 10/25/22 1944    sodium chloride flush 0.9 % injection 10 mL  10 mL IntraVENous PRN Radha Sandoval MD        0.9 % sodium chloride infusion   IntraVENous PRN Radha Sandoval MD        potassium chloride (KLOR-CON M) extended release tablet 40 mEq  40 mEq Oral PRN Radha Sandoval MD        Or    potassium bicarb-citric acid (EFFER-K) effervescent tablet 40 mEq  40 mEq Oral PRN Radha Sandoval MD        Or    potassium chloride 10 mEq/100 mL IVPB (Peripheral Line)  10 mEq IntraVENous PRN Radha Sandoval MD        enoxaparin (LOVENOX) injection 40 mg  40 mg SubCUTAneous Daily Radha Sandoval MD   40 mg at 10/25/22 1944    ondansetron (ZOFRAN-ODT) disintegrating tablet 4 mg  4 mg Oral Q8H PRN Alex Irving MD        Or    ondansetron [facility]) injection 4 mg  4 mg IntraVENous Q6H PRN Alex Irving MD        senna (SENOKOT) tablet 8.6 mg  1 tablet Oral Daily PRN Alex Irving MD        acetaminophen (TYLENOL) tablet 650 mg  650 mg Oral Q6H PRN Alex Irving MD        Or    acetaminophen (TYLENOL) suppository 650 mg  650 mg Rectal Q6H PRN Alex Irving MD        melatonin tablet 3 mg  3 mg Oral Nightly PRN Alex Irving MD           PRN Medications  HYDROcodone 5 mg - acetaminophen, sodium chloride flush, sodium chloride, potassium chloride **OR** potassium alternative oral replacement **OR** potassium chloride, ondansetron **OR** ondansetron, senna, acetaminophen **OR** acetaminophen, melatonin    Objective  Most Recent Recorded Vitals  /76   Pulse 68   Temp 97.9 °F (36.6 °C) (Oral)   Resp 18   Ht 5' 11\" (1.803 m)   Wt 149 lb 11.2 oz (67.9 kg)   SpO2 99%   BMI 20.88 kg/m²   I/O last 3 completed shifts: In: 240 [P.O.:240]  Out: 1300 [Urine:1300]  No intake/output data recorded.     Physical Exam:  General: AAO to person/place/time/purpose, NAD, no labored breathing  Eyes: conjunctivae/corneas clear, sclera non icteric  Skin: color/texture/turgor normal, no rashes or lesions  Lungs: CTAB, no retractions/use of accessory muscles, no vocal fremitus, no rhonchi, no crackle, no rales; left chest tube present  Heart: regular rate, regular rhythm, no murmur  Abdomen: soft, NT, bowel sounds normal  Extremities: atraumatic, no edema  Neurologic: cranial nerves 2-12 grossly intact, no slurred speech    Most Recent Labs  Lab Results   Component Value Date    WBC 6.6 10/26/2022    HGB 15.2 10/26/2022    HCT 43.5 10/26/2022     10/26/2022     10/26/2022    K 4.1 10/26/2022     10/26/2022    CREATININE 1.0 10/26/2022    BUN 11 10/26/2022    CO2 24 10/26/2022    GLUCOSE 83 10/26/2022    ALT 8 10/26/2022    AST 10 10/26/2022       XR CHEST PORTABLE   Final Result   No significant change of the past 20 hours. XR CHEST PORTABLE   Final Result   Interval resolution of left pneumothorax, post chest tube placement. XR CHEST (2 VW)   Final Result   Moderate to large left-sided pneumothorax, and mild mediastinal deviation to   the right. Critical results were called by Dr. Blanca Iqbal to [place], [place] on   10/24/2022 at 2:22 p.m. XR CHEST PORTABLE    (Results Pending)   XR CHEST PORTABLE    (Results Pending)       Echocardiogram: none    Assessment   Active Hospital Problems    Diagnosis     Pneumothorax [J93.9]      Priority: Medium         Plan  Left-sided pneumothorax, unclear etiology:  Chest x-ray noted  Chest tube placed in ED-- managed by pulmonology; likely to be pulled today per their report  Shawna Alvarez as needed for pain  Check respiratory viral panel  Tobacco cessation education    Intentional massive weight loss:  Defer to PCP for further work-up as an outpatient    Consults PULMONOLOGY  DVT prophylaxis LOVENOX  Code status FULL CODE  Medications, labs and imaging reviewed   Discharge plan: Once cleared from a pulmonary standpoint     Electronically signed by Reinaldo Wright DO, PGY-3 on 10/26/2022 at 7:20 AM    I can be reached through [facility]. Addendum: I have personally participated in a face-to-face history and physical exam on the date of service with the patient. I have discussed the case with the resident. I also participated in medical decision making with the resident on the date of service and I agree with all of the pertinent clinical information unless indicated in my editing of the note. I have reviewed and edited the note above based on my findings during my history, exam, and decision making on the same day of service. The vitals, labs, imaging, medications and treatment plan were reviewed independently by myself in addition to with the resident doctor.      I agree with the above documentation and treatment plan Electronically signed by Lobo Isabel MD on 10/26/2022 at 2:48 PM    I can be reached through [facility].

## 2022-10-26 NOTE — PROGRESS NOTES
Lynn Conroy M.D.,[facility]  Stacie Garcia D.O., [email or web address]., Madelaine Winter M.D. Loretta Johnson M.D. Alok Kearns D.O. Daily Pulmonary Progress Note    Patient:  Brandi Billy 33 y.o. male MRN: [id number]     Date of Service: 10/26/2022      Synopsis     We are following patient for chest tube    \"CC\" left-sided chest pain    Code status: Full      Subjective      Patient was seen and examined. Feeling okay with breathing. Chest tube clamped since this morning. Repeat chest x-ray lung reexpanded, no pneumothorax. Tube removed at bedside by Dr. Kaylie Alvarenga. Review of Systems:  Constitutional: Denies fever, weight loss, night sweats, and fatigue  Skin: Denies pigmentation, dark lesions, and rashes   HEENT: Denies hearing loss, tinnitus, ear drainage, epistaxis, sore throat, and hoarseness. Cardiovascular: Denies palpitations, chest pain, and chest pressure. Respiratory: Denies cough, dyspnea at rest, hemoptysis, apnea, and choking.   Gastrointestinal: Denies nausea, vomiting, poor appetite, diarrhea, heartburn or reflux  Genitourinary: Denies dysuria, frequency, urgency or hematuria  Musculoskeletal: Denies myalgias, muscle weakness, and bone pain  Neurological: Denies dizziness, vertigo, headache, and focal weakness  Psychological: Denies anxiety and depression  Endocrine: Denies heat intolerance and cold intolerance  Hematopoietic/Lymphatic: Denies bleeding problems and blood transfusions    24-hour events:  None    Objective   Vitals: BP (!) 150/81   Pulse 67   Temp 97.8 °F (36.6 °C) (Tympanic)   Resp 18   Ht 5' 11\" (1.803 m)   Wt 149 lb 11.2 oz (67.9 kg)   SpO2 97%   BMI 20.88 kg/m²     I/O:    Intake/Output Summary (Last 24 hours) at 10/26/2022 1449  Last data filed at 10/26/2022 0604  Gross per 24 hour   Intake --   Output 500 ml   Net -500 ml                        CURRENT MEDS :  Scheduled Meds:   sodium chloride flush  10 mL IntraVENous 2 times per day    enoxaparin 40 mg SubCUTAneous Daily       Physical Exam:  General Appearance: appears comfortable in no acute distress. HEENT: Normocephalic atraumatic without obvious abnormality   Neck: Lips, mucosa, and tongue normal.  Supple, symmetrical, trachea midline, no adenopathy;thyroid:  no enlargement/tenderness/nodules or JVD. Lung: Breath sounds CTA. Respirations   unlabored. Symmetrical expansion. Heart: RRR, normal S1, S2. No MRG  Abdomen: Soft, NT, ND. BS present x 4 quadrants. No bruit or organomegaly. Extremities: Pedal pulses 2+ symmetric b/l. Extremities normal, no cyanosis, clubbing, or edema. Musculokeletal: No joint swelling, no muscle tenderness. ROM normal in all joints of extremities. Neurologic: Mental status: Alert and Oriented X3 . Pertinent/ New Labs and Imaging Studies     Imaging Personally Reviewed:  Chest x-ray with chest tube clamped 10/26/2022       Impression   No evidence of acute cardiopulmonary disease is seen. No evidence of left pneumothorax is seen. ECHO  None on file    Labs:  Lab Results   Component Value Date/Time    WBC 6.6 10/26/2022 04:05 AM    HGB 15.2 10/26/2022 04:05 AM    HCT 43.5 10/26/2022 04:05 AM    MCV 97.8 10/26/2022 04:05 AM    MCH 34.2 10/26/2022 04:05 AM    MCHC 34.9 10/26/2022 04:05 AM    RDW 12.4 10/26/2022 04:05 AM     10/26/2022 04:05 AM    MPV 10.6 10/26/2022 04:05 AM     Lab Results   Component Value Date/Time     10/26/2022 04:05 AM    K 4.1 10/26/2022 04:05 AM     10/26/2022 04:05 AM    CO2 24 10/26/2022 04:05 AM    BUN 11 10/26/2022 04:05 AM    CREATININE 1.0 10/26/2022 04:05 AM    LABALBU 4.3 10/26/2022 04:05 AM    CALCIUM 9.7 10/26/2022 04:05 AM    LABGLOM >60 10/26/2022 04:05 AM     No results found for: PROTIME, INR  No results for input(s): PROBNP in the last 72 hours. No results for input(s): PROCAL in the last 72 hours. This SmartLink has not been configured with any valid records.        Micro:  No results for input(s): CULTRESP in the last 72 hours. No results for input(s): LABGRAM in the last 72 hours. No results for input(s): LEGUR in the last 72 hours. No results for input(s): STREPNEUMAGU in the last 72 hours. No results for input(s): LP1UAG in the last 72 hours. Assessment:    Cough induced left-sided pneumothorax with mild mediastinal shift requiring pigtail chest tube placement 10/24/2022  Ongoing nicotine dependence half pack per day since age 16  Marijuana use  Close to 120 pound weight loss over 5 years       Plan:   Chest tube clamped with repeat chest x-ray-lung reexpanded no pneumothorax  Chest tube removed at bedside  Tobacco cessation  DVT, GI prophylaxis   Okay to PA home later today. Activity restrictions reviewed with patient for the next few weeks no heavy lifting or rigorous activities. No submerging in water/bathtub until exit site closed. This plan of care was reviewed in collaboration with Dr. Anktia Byrnes  Electronically signed by ALEXANDRE Jurado CNP on 10/26/2022 at 2:49 PM    I personally saw, examined, and cared for the patient. Labs, medications, radiographs reviewed. I agree with history exam and plans detailed in NP note.     Chest tube removed  Monitor x 2 hours and then okay to discharge    Hien Burk MD

## 2022-10-26 NOTE — PROGRESS NOTES
Physical Therapy  Facility/Department: [address] INTERNAL MEDICINE 2      Name: Selvin Bryson  : 1995  MRN: [id number]  Date of Service: 10/26/2022    Order received for PT evaluation. Pt independent. No PT needs at this time.    [facility] PT [id number]

## 2022-10-26 NOTE — PROGRESS NOTES
Occupational Therapy    OT eval and treat orders received and chart reviewed. Attempt made but pt and RN report pt is independent and able to complete own self care. No acute OT needs at this time. OT orders discontinued.      Tori Chanel, OTR/L

## 2022-10-26 NOTE — CARE COORDINATION
10/26/2022  Social Work Discharge Planning:Pulm to see. Chest tube to suction. Pt is from home with his aunt and uncle where they reside in a trailer with a ramp. Pts cousin provides transportation and Pt has no DME, HHC or ARAM history. At this time plan is to return with no needs. Awaiting therapy evals. Electronically signed by ASUNCION Farias on 10/26/2022 at 9:50 AM

## 2022-10-27 NOTE — DISCHARGE SUMMARY
Internal Medicine Discharge Summary    NAME: Pollie Dubin :  1995  MRN:  [id number] [address] [facility],     ADMITTED: 10/24/2022   DISCHARGED: 10/26/2022  5:38 PM    ADMITTING PHYSICIAN: Kitty att. providers found    PCP: Marya Reese DO    CONSULTANT(S):   IP CONSULT TO INTERNAL MEDICINE  IP CONSULT TO SOCIAL WORK  IP CONSULT TO PULMONOLOGY     ADMITTING DIAGNOSIS:   Pneumothorax [J93.9]     Please see H&P for further details    DISCHARGE DIAGNOSES:   Active Hospital Problems    Diagnosis     Pneumothorax [J93.9]      Priority: Medium       BRIEF HISTORY OF PRESENT ILLNESS: Pollie Dubin is a 33 y.o. male patient of Marya Reese DO who  has a past medical history of Bronchitis and Pneumothorax. who originally had concerns including Chest Pain (States since 4 am- numbness in arms). at presentation on 10/24/2022, and was found to have Pneumothorax [J93.9] after workup. Please see H&P for further details. HOSPITAL COURSE:   The patient presented to the hospital with the chief complaint of Chest Pain (States since 4 am- numbness in arms)  . The patient was admitted to the hospital.     Left-sided pneumothorax, unclear etiology:  Chest x-ray noted  Chest tube placed in ED-- managed by pulmonology; likely to be pulled today per their report  Carmita Foley as needed for pain  Check respiratory viral panel  Tobacco cessation education      Chest tube was removed patient was cleared by pulm for DC home       BRIEF PHYSICAL EXAMINATION AND LABORATORIES ON DAY OF DISCHARGE:  VITALS:  BP (!) 150/81   Pulse 67   Temp 97.8 °F (36.6 °C) (Tympanic)   Resp 18   Ht 5' 11\" (1.803 m)   Wt 149 lb 11.2 oz (67.9 kg)   SpO2 97%   BMI 20.88 kg/m²       Please see note from the same day.      LABS[de-identified]  Recent Labs     10/24/22  1438 10/25/22  0357 10/26/22  0405    138 138   K 3.7 4.1 4.1   CL 98 103 100   CO2  24   BUN 7 7 11   CREATININE 1.1 0.9 1.0   GLUCOSE 111* 91 83   CALCIUM 10.1 9.9 9.7     Recent Labs     10/24/22  1438 10/25/22  0357 10/26/22  0405   ALKPHOS 70 66 58   ALT 13 9 8   AST 18 11 10   PROT 7.6 6.8 6.8   BILITOT 1.0 1.0 0.9   LABALBU 4.9 4.3 4.3     Recent Labs     10/24/22  1420 10/25/22  0357 10/26/22  0405   WBC 12.0* 8.4 6.6   RBC 4.74 4.27 4.45   HGB 16.1 14.6 15.2   HCT 46.6 41.8 43.5   MCV 98.3 97.9 97.8   MCH 34.0 34.2 34.2   MCHC 34.5 34.9* 34.9*   RDW 12.3 12.5 12.4    188 176   MPV 10.7 11.1 10.6     No results found for: LABA1C  No results found for: INR, PROTIME   No results found for: TSH  No results found for: TRIG, HDL, LDLCALC  No results for input(s): MG in the last 72 hours. No results for input(s): CKTOTAL, CKMB, TROPONINI in the last 72 hours. No results for input(s): LACTA in the last 72 hours. IMAGING:  XR CHEST (2 VW)    Result Date: 10/24/2022  EXAMINATION: TWO XRAY VIEWS OF THE CHEST 10/24/2022 2:02 pm COMPARISON: None. HISTORY: ORDERING SYSTEM PROVIDED HISTORY: CP TECHNOLOGIST PROVIDED HISTORY: Reason for exam:->CP FINDINGS: The lungs are without acute focal process. There is no effusion. There is a moderate to large left-sided pneumothorax measuring 6.7 cm at the apex. The cardiomediastinal silhouette is without acute process. The osseous structures are without acute process. Moderate to large left-sided pneumothorax, and mild mediastinal deviation to the right. Critical results were called by Dr. Zia Vasquez to [place], [place] on 10/24/2022 at 2:22 p.m. XR CHEST PORTABLE    Result Date: 10/26/2022  EXAMINATION: ONE XRAY VIEW OF THE CHEST 10/26/2022 9:52 am COMPARISON: 10/26/2022 at 6:30 a.m.. HISTORY: ORDERING SYSTEM PROVIDED HISTORY: chest tube, ptx TECHNOLOGIST PROVIDED HISTORY: Reason for exam:->chest tube, ptx FINDINGS: Drainage catheter visualized in the superolateral aspect of the left upper lung field, no evidence of residual pneumothorax is seen. The cardiomediastinal silhouette is without acute process.  The lungs are without acute focal process. There is no effusion or pneumothorax. The osseous structures are without acute process. No evidence of acute cardiopulmonary disease is seen. No evidence of left pneumothorax is seen. XR CHEST PORTABLE    Result Date: 10/26/2022  EXAMINATION: ONE XRAY VIEW OF THE CHEST 10/26/2022 5:36 am COMPARISON: 25 October 2022 HISTORY: ORDERING SYSTEM PROVIDED HISTORY: chest tube, ptx TECHNOLOGIST PROVIDED HISTORY: Reason for exam:->chest tube, ptx FINDINGS: Indwelling left chest tube as before. Tiny residual left apical pneumothorax. A localized opacity at the left lower chest I suspect relates to a chest wall soft tissue artifact. Normal heart and pulmonary vascularity. Neither costophrenic angle appears newly blunted. Tiny residual left apical pneumothorax. See above. XR CHEST PORTABLE    Result Date: 10/25/2022  EXAMINATION: ONE XRAY VIEW OF THE CHEST 10/25/2022 2:07 pm COMPARISON: 24 October 2022 HISTORY: ORDERING SYSTEM PROVIDED HISTORY: ptx TECHNOLOGIST PROVIDED HISTORY: Reason for exam:->ptx FINDINGS: Two views AP erect portable chest demonstrates left-sided pigtail catheter in the left upper lobe with very minimal residual left apical pneumothorax remaining present. There are no focal alveolar infiltrates or effusions. The cardiac silhouette is normal.     No significant change of the past 20 hours. XR CHEST PORTABLE    Result Date: 10/24/2022  EXAMINATION: ONE XRAY VIEW OF THE CHEST 10/24/2022 5:20 pm COMPARISON: 2 hours prior. HISTORY: ORDERING SYSTEM PROVIDED HISTORY: chest tube TECHNOLOGIST PROVIDED HISTORY: Reason for exam:->chest tube FINDINGS: There is near complete resolution of left pneumothorax post insertion of left-sided chest tube the tip projected in the left upper thorax. The lungs are clear. The heart is not enlarged. Interval resolution of left pneumothorax, post chest tube placement.        MICROBIOLOGY:  BLOOD CX #1  No results for input(s): BC in the last 72 hours. BLOOD CX #2  No results for input(s): Lelan Salts in the last 72 hours. TIP CULTURE  No results for input(s): CXCATHTIP in the last 72 hours. CULTURE, RESPIRATORY   No results for input(s): CULTRESP in the last 72 hours. RESPIRATORY SMEAR  No results for input(s): RESPSMEAR in the last 72 hours. ECHO:      DISPOSITION:  The patient's condition is good. The patient is being discharged to home    DISCHARGE MEDICATIONS:      Medication List      You have not been prescribed any medications. There are no discharge medications for this patient. There are no discharge medications for this patient. There are no discharge medications for this patient. INTERNAL MEDICINE FOLLOW UP/INSTRUCTIONS:  Follow-up with primary care physician within 1 week of discharge from hospital  Please review changes to pre-hospital admission medications and prescriptions for new medications upon discharge from the hospital with PCP  Please review results of labs and imaging studies with PCP  Follow-up with consultants as directed by them   If recurrence or worsening of symptoms please call primary care physician or return to the ER immediately  Diet: No diet orders on file    Preparing for this patient's discharge, including paperwork, orders, instructions, and meeting with patient did required >35 minutes.     Electronically signed by Lalita Stone MD on 10/27/2022 at 12:02 AM

## 2025-05-09 NOTE — PROGRESS NOTES
Discharge instructions and medications reviewed with patient and all questions answered. IV sites and telemetry monitor removed. Once case management discusses transportation with him and resources.